# Patient Record
Sex: MALE | Race: WHITE | NOT HISPANIC OR LATINO | URBAN - METROPOLITAN AREA
[De-identification: names, ages, dates, MRNs, and addresses within clinical notes are randomized per-mention and may not be internally consistent; named-entity substitution may affect disease eponyms.]

---

## 2024-04-01 ENCOUNTER — CONSULT (OUTPATIENT)
Dept: ENDOCRINOLOGY | Facility: CLINIC | Age: 29
End: 2024-04-01
Payer: COMMERCIAL

## 2024-04-01 VITALS
HEART RATE: 61 BPM | WEIGHT: 179 LBS | HEIGHT: 66 IN | SYSTOLIC BLOOD PRESSURE: 128 MMHG | BODY MASS INDEX: 28.77 KG/M2 | TEMPERATURE: 97.1 F | DIASTOLIC BLOOD PRESSURE: 75 MMHG

## 2024-04-01 DIAGNOSIS — Z31.41 FERTILITY TESTING: Primary | ICD-10-CM

## 2024-04-01 PROCEDURE — 99202 OFFICE O/P NEW SF 15 MIN: CPT

## 2024-04-01 PROCEDURE — 99212 OFFICE O/P EST SF 10 MIN: CPT

## 2024-04-01 NOTE — PROGRESS NOTES
Patient is the partner of МАРИНА Gagnon 1997, who presents to [facility] to order fertility testing and treatment     PARTNER HISTORY  Partner Name:  Lake Torres  :  1995, 28 yrs old  Occupation:  construction  Prior fertility history:  no  PMH:  h/o Lyme's disease -treated  PSH:  rt knee surgery  Smoking: no  Alcohol Use:  no  Drug Use:  no  Medications:  no  Injuries:  no  STD:  no  Please select all that are applicable:    SA:  no  SA Results:  NA    Plan:  STDs and SA if does IUI  Possible genetic screening- florinda Torres response    Follow up Plan:  6 week in person visit to review test results and treatment options      Sandra Santacruz CNP 24 5:10 PM

## 2024-07-10 ENCOUNTER — APPOINTMENT (OUTPATIENT)
Dept: ENDOCRINOLOGY | Facility: CLINIC | Age: 29
End: 2024-07-10